# Patient Record
Sex: MALE | Race: WHITE | NOT HISPANIC OR LATINO | Employment: OTHER | URBAN - METROPOLITAN AREA
[De-identification: names, ages, dates, MRNs, and addresses within clinical notes are randomized per-mention and may not be internally consistent; named-entity substitution may affect disease eponyms.]

---

## 2018-05-08 ENCOUNTER — HOSPITAL ENCOUNTER (EMERGENCY)
Facility: HOSPITAL | Age: 58
Discharge: HOME | End: 2018-05-08
Attending: EMERGENCY MEDICINE | Admitting: EMERGENCY MEDICINE

## 2018-05-08 ENCOUNTER — APPOINTMENT (OUTPATIENT)
Dept: GENERAL RADIOLOGY | Facility: HOSPITAL | Age: 58
End: 2018-05-08

## 2018-05-08 VITALS
SYSTOLIC BLOOD PRESSURE: 167 MMHG | DIASTOLIC BLOOD PRESSURE: 101 MMHG | TEMPERATURE: 97.6 F | WEIGHT: 200 LBS | BODY MASS INDEX: 29.62 KG/M2 | OXYGEN SATURATION: 91 % | HEIGHT: 69 IN | HEART RATE: 67 BPM | RESPIRATION RATE: 24 BRPM

## 2018-05-08 DIAGNOSIS — F41.9 ANXIETY: Primary | ICD-10-CM

## 2018-05-08 DIAGNOSIS — N28.9 RENAL INSUFFICIENCY: ICD-10-CM

## 2018-05-08 DIAGNOSIS — R03.0 ELEVATED BLOOD PRESSURE READING: ICD-10-CM

## 2018-05-08 LAB
ANION GAP SERPL CALCULATED.3IONS-SCNC: 3 MMOL/L (ref 3–11)
BILIRUB UR QL STRIP: NEGATIVE
BUN BLD-MCNC: 20 MG/DL (ref 9–23)
BUN/CREAT SERPL: 11.1 (ref 7–25)
CALCIUM SPEC-SCNC: 9.8 MG/DL (ref 8.7–10.4)
CHLORIDE SERPL-SCNC: 105 MMOL/L (ref 99–109)
CLARITY UR: CLEAR
CO2 SERPL-SCNC: 28 MMOL/L (ref 20–31)
COLOR UR: YELLOW
CREAT BLD-MCNC: 1.8 MG/DL (ref 0.6–1.3)
GFR SERPL CREATININE-BSD FRML MDRD: 39 ML/MIN/1.73
GLUCOSE BLD-MCNC: 113 MG/DL (ref 70–100)
GLUCOSE UR STRIP-MCNC: NEGATIVE MG/DL
HGB UR QL STRIP.AUTO: NEGATIVE
KETONES UR QL STRIP: NEGATIVE
LEUKOCYTE ESTERASE UR QL STRIP.AUTO: NEGATIVE
NITRITE UR QL STRIP: NEGATIVE
PH UR STRIP.AUTO: 6 [PH] (ref 5–8)
POTASSIUM BLD-SCNC: 4.2 MMOL/L (ref 3.5–5.5)
PROT UR QL STRIP: NEGATIVE
SODIUM BLD-SCNC: 136 MMOL/L (ref 132–146)
SP GR UR STRIP: 1.02 (ref 1–1.03)
TROPONIN I SERPL-MCNC: 0 NG/ML (ref 0–0.07)
TROPONIN I SERPL-MCNC: 0 NG/ML (ref 0–0.07)
UROBILINOGEN UR QL STRIP: NORMAL

## 2018-05-08 PROCEDURE — 96374 THER/PROPH/DIAG INJ IV PUSH: CPT

## 2018-05-08 PROCEDURE — 80048 BASIC METABOLIC PNL TOTAL CA: CPT | Performed by: EMERGENCY MEDICINE

## 2018-05-08 PROCEDURE — 71045 X-RAY EXAM CHEST 1 VIEW: CPT

## 2018-05-08 PROCEDURE — 84484 ASSAY OF TROPONIN QUANT: CPT

## 2018-05-08 PROCEDURE — 36415 COLL VENOUS BLD VENIPUNCTURE: CPT

## 2018-05-08 PROCEDURE — 81003 URINALYSIS AUTO W/O SCOPE: CPT | Performed by: EMERGENCY MEDICINE

## 2018-05-08 PROCEDURE — 93005 ELECTROCARDIOGRAM TRACING: CPT | Performed by: EMERGENCY MEDICINE

## 2018-05-08 PROCEDURE — 25010000002 LORAZEPAM PER 2 MG: Performed by: EMERGENCY MEDICINE

## 2018-05-08 PROCEDURE — 99284 EMERGENCY DEPT VISIT MOD MDM: CPT

## 2018-05-08 RX ORDER — CHLORAL HYDRATE 500 MG
CAPSULE ORAL
COMMUNITY

## 2018-05-08 RX ORDER — LORAZEPAM 2 MG/ML
1 INJECTION INTRAMUSCULAR ONCE
Status: COMPLETED | OUTPATIENT
Start: 2018-05-08 | End: 2018-05-08

## 2018-05-08 RX ORDER — FAMOTIDINE 20 MG/1
20 TABLET, FILM COATED ORAL 2 TIMES DAILY
COMMUNITY

## 2018-05-08 RX ADMIN — LORAZEPAM 1 MG: 2 INJECTION INTRAMUSCULAR; INTRAVENOUS at 03:19

## 2018-05-08 NOTE — DISCHARGE INSTRUCTIONS
Follow up with one of the [facility] physician groups below to setup primary care. If you have trouble following up, please call Kathya Little, our transitional care nurse, at [phone].    (Dr. Doe, Dr. Ramsay, Dr. Hernandez, and Dr. Jackson.)  [facility], Primary Care, [phone], [address], [place] [address]    [facility], Primary Care, [phone], [address], [address] [place], [address] [facility]) [facility] Medical [facility], Primary Care, [phone], [address], [address] [place], [address] [facility], Primary Care, [phone], [address], [address] [place], [address]     [place] [facility], Primary Care, [phone], [address], [address] [place], [address]    [place] [facility], Primary Care, [phone], [address]    [place], [address] [facility], Primary Care, [phone], [address] [place], [address] [facility], Primary Care, [phone], [address] [place], [address] [facility]) [facility] Medical [facility], Primary Care, [phone], [address] [place], [address] [facility] Medical [facility], Primary Care, [phone], [address], [address] [place], [address] [facility], Primary Care, [phone], [address], [place], [address]     [facility], Primary Care, [phone], [address] [id number]

## 2018-05-08 NOTE — ED PROVIDER NOTES
"Subjective   .  57-year-old male presents complaining of anxiety and elevated blood pressure.  The patient's only cardiac risk factor is smoking.  He states that for the past few days, he has been experiencing intermittent episodes at night in which she \"gets excited.\"  According to his family at bedside, this is the patient's way of stating that he has been experiencing anxiety.  His symptoms only seem to come on at night.  He is fine during the day.  At night, he states that he has unprovoked episodes in which he \"gets excited\" and cannot go back to sleep.  He denies any accompanying chest pain or shortness of breath.  No palpitations.  He is unsure as to what may be triggering the episodes.  He feels anxious at this time.  He denies any drug or alcohol use.  No SI or HI.  He feels safe at home.        History provided by:  Patient  Hypertension   Severity:  Unable to specify  Onset quality:  Sudden  Duration: Onset tonight.  Timing:  Constant  Progression:  Worsening  Chronicity:  Recurrent  Relieved by:  None tried  Worsened by:  Nothing  Ineffective treatments:  None tried  Associated symptoms: anxiety    Associated symptoms: no fever    Risk factors: tobacco use    Risk factors: no cardiac disease        Review of Systems   Constitutional: Negative for chills and fever.   Psychiatric/Behavioral: The patient is nervous/anxious.    All other systems reviewed and are negative.      No past medical history on file.    Allergies not on file    No past surgical history on file.    No family history on file.    Social History     Social History Main Topics   • Drug use: Unknown     Other Topics Concern   • Not on file         Objective   Physical Exam   Constitutional: He is oriented to person, place, and time. He appears well-developed and well-nourished. No distress.   Well-appearing male in no acute distress   HENT:   Head: Normocephalic and atraumatic.   Mouth/Throat: Oropharynx is clear and moist.   Eyes: EOM " are normal. Pupils are equal, round, and reactive to light.   Neck: Normal range of motion. No JVD present.   Cardiovascular: Normal rate, regular rhythm, normal heart sounds and intact distal pulses.  Exam reveals no gallop and no friction rub.    No murmur heard.  Pulmonary/Chest: Effort normal and breath sounds normal. No respiratory distress. He has no wheezes. He has no rales.   Abdominal: Soft. Bowel sounds are normal. He exhibits no distension and no mass. There is no tenderness. There is no guarding.   Musculoskeletal: Normal range of motion. He exhibits no edema.   Neurological: He is alert and oriented to person, place, and time. No cranial nerve deficit. Coordination normal.   Normal gait, 5 out of 5 strength in all fours, neurovascularly intact distally in all fours   Skin: Skin is warm and dry. No rash noted. He is not diaphoretic. No erythema. No pallor.   Psychiatric: He has a normal mood and affect. Judgment and thought content normal.   Nursing note and vitals reviewed.      Procedures         ED Course  ED Course   Comment By Time   57-year-old male presents for evaluation of intermittent episodes of anxiety over the past 2 days.  Also of note, he is family at bedside states that his blood pressure has been elevated over the same time span.  The patient's symptoms only seem to come on at night, and he is completely asymptomatic during the day.  He is unsure as to what may be triggering his episodes.  On arrival to the ED, patient well-appearing with benign exam.  We will obtain labs, chest x-ray, and EKG and reassess after initial interventions. Mike Amezquita MD 05/08 0320   Initial EKG revealed normal sinus rhythm with a heart rate of 73 and no ST segments suggestive of or concerning for ischemia. Mike Amezquita MD 05/08 0334   Doubt pulmonary embolism given the intermittent and episodic nocturnal nature of the patient's symptoms.  Clinical presentation does not seem consistent with an  aortic dissection either. Mike Amezquita MD 05/08 0331   Labs remarkable for mild renal insufficiency (no baseline for comparison). Mike Amezquita MD 05/08 5955   CXR negative.  Mike Amezquita MD 05/08 8608   The patient's blood pressure improved spontaneously after anxiolytics. Mike Amezquita MD 05/08 0583   Repeat troponin/EKG negative/unchanged.  Doubt ACS, PE, dissection, or emergent cardiothoracic process at this time based on exam, history, clinical presentation, gestalt, and objective findings in the ED.  Reassured and counseled regarding symptomatic treatment.  Given the patient's age and risk factors, we will refer him to our chest pain clinic and he will follow-up within 72 hours.  His symptoms are most likely secondary to anxiety.  Agreeable with plan and given appropriate return precautions. Mike Amezquita MD 05/08 0554       Recent Results (from the past 24 hour(s))   POC Troponin, Rapid    Collection Time: 05/08/18  3:19 AM   Result Value Ref Range    Troponin I 0.00 0.00 - 0.07 ng/mL   Basic Metabolic Panel    Collection Time: 05/08/18  3:30 AM   Result Value Ref Range    Glucose 113 (H) 70 - 100 mg/dL    BUN 20 9 - 23 mg/dL    Creatinine 1.80 (H) 0.60 - 1.30 mg/dL    Sodium 136 132 - 146 mmol/L    Potassium 4.2 3.5 - 5.5 mmol/L    Chloride 105 99 - 109 mmol/L    CO2 28.0 20.0 - 31.0 mmol/L    Calcium 9.8 8.7 - 10.4 mg/dL    eGFR Non African Amer 39 (L) >60 mL/min/1.73    BUN/Creatinine Ratio 11.1 7.0 - 25.0    Anion Gap 3.0 3.0 - 11.0 mmol/L   Urinalysis With / Culture If Indicated - Urine, Clean Catch    Collection Time: 05/08/18  4:13 AM   Result Value Ref Range    Color, UA Yellow Yellow, Straw    Appearance, UA Clear Clear    pH, UA 6.0 5.0 - 8.0    Specific Gravity, UA 1.021 1.001 - 1.030    Glucose, UA Negative Negative    Ketones, UA Negative Negative    Bilirubin, UA Negative Negative    Blood, UA Negative Negative    Protein, UA Negative Negative    Leuk Esterase, UA  Negative Negative    Nitrite, UA Negative Negative    Urobilinogen, UA 0.2 E.U./dL 0.2 - 1.0 E.U./dL   POC Troponin, Rapid    Collection Time: 05/08/18  5:32 AM   Result Value Ref Range    Troponin I 0.00 0.00 - 0.07 ng/mL     Note: In addition to lab results from this visit, the labs listed above may include labs taken at another facility or during a different encounter within the last 24 hours. Please correlate lab times with ED admission and discharge times for further clarification of the services performed during this visit.    XR Chest 1 View   Final Result      1. No acute findings.      2. Non-acute findings are described above.      THIS DOCUMENT HAS BEEN ELECTRONICALLY SIGNED BY FRANCISCO MOSQUEDA MD        Vitals:    05/08/18 0418 05/08/18 0500 05/08/18 0530 05/08/18 0545   BP:  125/78 (!) 167/101    BP Location:       Patient Position:       Pulse: 72 65 65 67   Resp:       Temp:       TempSrc:       SpO2: 93% 91% 96% 91%   Weight:       Height:         Medications   LORazepam (ATIVAN) injection 1 mg (1 mg Intravenous Given 5/8/18 0319)     ECG/EMG Results (last 24 hours)     ** No results found for the last 24 hours. **                      [facility]    Final diagnoses:   Anxiety   Renal insufficiency   Elevated blood pressure reading       Documentation assistance provided by ahmet Valladares.  Information recorded by the screlvia was done at my direction and has been verified and validated by me.     Carroll Valladares  05/08/18 0307       Mike Amezquita MD  05/08/18 7508